# Patient Record
(demographics unavailable — no encounter records)

---

## 2025-02-21 NOTE — HISTORY OF PRESENT ILLNESS
[Sneakers] : breezy [FreeTextEntry1] : Patient presents to clinic for f/u after presenting to ED for left foot pain. He says the pain had been ongoing for a month and stopping him from his daily walking of 1-2 miles.  In the Ed he was evaluated and told to change shoe gear to something more supportive and add insert OTC He relays that he is already noticing improvement in his pain which is now a 2-3/10

## 2025-02-21 NOTE — PHYSICAL EXAM
[2+] : left foot dorsalis pedis 2+ [Normal Foot/Ankle] : Both lower extremities were exposed and visualized. Standing exam demonstrates normal foot posture and alignment. Hindfoot exam shows no hindfoot valgus or varus [Skin Lesions] : no skin lesions [Sensation] : the sensory exam was normal to light touch and pinprick [Motor Exam] : the motor exam was normal [Ankle Swelling (On Exam)] : not present [Varicose Veins Of Lower Extremities] : not present [] : not present [Delayed in the Right Toes] : capillary refills normal in right toes [Delayed in the Left Toes] : capillary refills normal in the left toes [de-identified] : Left foot gastrosoleal equinus POP to left medical calcaneal tuberosity [Foot Ulcer] : no foot ulcer [Position Sense Dec.] : normal position sense at the level of the toes [Diminished Throughout Right Foot] : normal sensation with monofilament testing throughout right foot [Diminished Throughout Left Foot] : normal sensation with monofilament testing throughout left foot

## 2025-02-21 NOTE — ASSESSMENT
[Verbal] : verbal [Patient] : patient [FreeTextEntry1] : Patient seen and assessed No complaints for right foot left foot equinus noted with pain on palpation to left plantar fascia insertion Discussed correct shoe gear for patients foot type- wide foot with moderate arches Educated on proper inserts- will try power step inserts as he is currently using OTC dr soto Does not require injection at this time- will attempt a 2 week meloxicam trial to alleviate lingering inflammation and educated on proper stretching routine  rtc 2 months to re-assess

## 2025-02-21 NOTE — PHYSICAL EXAM
[2+] : left foot dorsalis pedis 2+ [Normal Foot/Ankle] : Both lower extremities were exposed and visualized. Standing exam demonstrates normal foot posture and alignment. Hindfoot exam shows no hindfoot valgus or varus [Skin Lesions] : no skin lesions [Sensation] : the sensory exam was normal to light touch and pinprick [Motor Exam] : the motor exam was normal [Ankle Swelling (On Exam)] : not present [Varicose Veins Of Lower Extremities] : not present [] : not present [Delayed in the Right Toes] : capillary refills normal in right toes [Delayed in the Left Toes] : capillary refills normal in the left toes [de-identified] : Left foot gastrosoleal equinus POP to left medical calcaneal tuberosity [Foot Ulcer] : no foot ulcer [Position Sense Dec.] : normal position sense at the level of the toes [Diminished Throughout Right Foot] : normal sensation with monofilament testing throughout right foot [Diminished Throughout Left Foot] : normal sensation with monofilament testing throughout left foot